# Patient Record
Sex: MALE | Race: WHITE | NOT HISPANIC OR LATINO | URBAN - METROPOLITAN AREA
[De-identification: names, ages, dates, MRNs, and addresses within clinical notes are randomized per-mention and may not be internally consistent; named-entity substitution may affect disease eponyms.]

---

## 2018-08-22 ENCOUNTER — TELEPHONE (OUTPATIENT)
Dept: OPHTHALMOLOGY | Facility: CLINIC | Age: 23
End: 2018-08-22

## 2018-08-22 NOTE — TELEPHONE ENCOUNTER
Eye clinic perform cycloplegic vision screening (dilated eye exams) and able to perform cornea topography    Left message with information and for Friday requested appt-- Dr. Calhoun able to see this Friday AM    Pt may call [phone] option 3 for scheduling  Miki Pantoja RN 9:49 AM 08/22/18

## 2018-08-22 NOTE — TELEPHONE ENCOUNTER
Health Call Center    Phone Message    May a detailed message be left on voicemail: yes    Reason for Call: Other: Kat, mother of prospective pt called requesting to know if our Eye Clinic does - CYCOPLEGIC Vision Screening, CORNEAL TOPOGRAPHY. Pt is being considered by the [employer] for [employer], has already had a general eye exam by the [employer], but needs these last 2 tests. Kat is requesting that if we do offer these tests that she would like to get her son in as early as possible, even today or friday/any time next week.      Action Taken: Message routed to:  [facility] ([facility] Eye